# Patient Record
Sex: MALE | NOT HISPANIC OR LATINO | Employment: FULL TIME | URBAN - METROPOLITAN AREA
[De-identification: names, ages, dates, MRNs, and addresses within clinical notes are randomized per-mention and may not be internally consistent; named-entity substitution may affect disease eponyms.]

---

## 2023-03-03 LAB
ALANINE AMINOTRANSFERASE (SGPT) (U/L) IN SER/PLAS: 30 U/L (ref 10–52)
ALBUMIN (G/DL) IN SER/PLAS: 4.5 G/DL (ref 3.4–5)
ALKALINE PHOSPHATASE (U/L) IN SER/PLAS: 38 U/L (ref 33–120)
ANION GAP IN SER/PLAS: 14 MMOL/L (ref 10–20)
APPEARANCE, URINE: CLEAR
ASPARTATE AMINOTRANSFERASE (SGOT) (U/L) IN SER/PLAS: 25 U/L (ref 9–39)
BASOPHILS (10*3/UL) IN BLOOD BY AUTOMATED COUNT: 0.05 X10E9/L (ref 0–0.1)
BASOPHILS/100 LEUKOCYTES IN BLOOD BY AUTOMATED COUNT: 0.9 % (ref 0–2)
BILIRUBIN TOTAL (MG/DL) IN SER/PLAS: 1 MG/DL (ref 0–1.2)
BILIRUBIN, URINE: NEGATIVE
BLOOD, URINE: NEGATIVE
CALCIUM (MG/DL) IN SER/PLAS: 9.4 MG/DL (ref 8.6–10.3)
CARBON DIOXIDE, TOTAL (MMOL/L) IN SER/PLAS: 26 MMOL/L (ref 21–32)
CHLORIDE (MMOL/L) IN SER/PLAS: 106 MMOL/L (ref 98–107)
CHOLESTEROL (MG/DL) IN SER/PLAS: 159 MG/DL (ref 0–199)
CHOLESTEROL IN HDL (MG/DL) IN SER/PLAS: 66.2 MG/DL
CHOLESTEROL/HDL RATIO: 2.4
COLOR, URINE: YELLOW
CREATININE (MG/DL) IN SER/PLAS: 1.04 MG/DL (ref 0.5–1.3)
EOSINOPHILS (10*3/UL) IN BLOOD BY AUTOMATED COUNT: 0.03 X10E9/L (ref 0–0.7)
EOSINOPHILS/100 LEUKOCYTES IN BLOOD BY AUTOMATED COUNT: 0.5 % (ref 0–6)
ERYTHROCYTE DISTRIBUTION WIDTH (RATIO) BY AUTOMATED COUNT: 12.3 % (ref 11.5–14.5)
ERYTHROCYTE MEAN CORPUSCULAR HEMOGLOBIN CONCENTRATION (G/DL) BY AUTOMATED: 32.6 G/DL (ref 32–36)
ERYTHROCYTE MEAN CORPUSCULAR VOLUME (FL) BY AUTOMATED COUNT: 92 FL (ref 80–100)
ERYTHROCYTES (10*6/UL) IN BLOOD BY AUTOMATED COUNT: 4.82 X10E12/L (ref 4.5–5.9)
GFR MALE: >90 ML/MIN/1.73M2
GLUCOSE (MG/DL) IN SER/PLAS: 87 MG/DL (ref 74–99)
GLUCOSE, URINE: NEGATIVE MG/DL
HEMATOCRIT (%) IN BLOOD BY AUTOMATED COUNT: 44.2 % (ref 41–52)
HEMOGLOBIN (G/DL) IN BLOOD: 14.4 G/DL (ref 13.5–17.5)
IMMATURE GRANULOCYTES/100 LEUKOCYTES IN BLOOD BY AUTOMATED COUNT: 0.4 % (ref 0–0.9)
KETONES, URINE: NEGATIVE MG/DL
LDL: 79 MG/DL (ref 0–99)
LEUKOCYTE ESTERASE, URINE: NEGATIVE
LEUKOCYTES (10*3/UL) IN BLOOD BY AUTOMATED COUNT: 5.6 X10E9/L (ref 4.4–11.3)
LYMPHOCYTES (10*3/UL) IN BLOOD BY AUTOMATED COUNT: 1.76 X10E9/L (ref 1.2–4.8)
LYMPHOCYTES/100 LEUKOCYTES IN BLOOD BY AUTOMATED COUNT: 31.3 % (ref 13–44)
MONOCYTES (10*3/UL) IN BLOOD BY AUTOMATED COUNT: 0.53 X10E9/L (ref 0.1–1)
MONOCYTES/100 LEUKOCYTES IN BLOOD BY AUTOMATED COUNT: 9.4 % (ref 2–10)
NEUTROPHILS (10*3/UL) IN BLOOD BY AUTOMATED COUNT: 3.24 X10E9/L (ref 1.2–7.7)
NEUTROPHILS/100 LEUKOCYTES IN BLOOD BY AUTOMATED COUNT: 57.5 % (ref 40–80)
NITRITE, URINE: NEGATIVE
PH, URINE: 5 (ref 5–8)
PLATELETS (10*3/UL) IN BLOOD AUTOMATED COUNT: 248 X10E9/L (ref 150–450)
POTASSIUM (MMOL/L) IN SER/PLAS: 4.8 MMOL/L (ref 3.5–5.3)
PROSTATE SPECIFIC AG (NG/ML) IN SER/PLAS: 0.82 NG/ML (ref 0–4)
PROTEIN TOTAL: 7.4 G/DL (ref 6.4–8.2)
PROTEIN, URINE: NEGATIVE MG/DL
SODIUM (MMOL/L) IN SER/PLAS: 141 MMOL/L (ref 136–145)
SPECIFIC GRAVITY, URINE: 1.02 (ref 1–1.03)
THYROTROPIN (MIU/L) IN SER/PLAS BY DETECTION LIMIT <= 0.05 MIU/L: 0.76 MIU/L (ref 0.44–3.98)
TRIGLYCERIDE (MG/DL) IN SER/PLAS: 67 MG/DL (ref 0–149)
UREA NITROGEN (MG/DL) IN SER/PLAS: 12 MG/DL (ref 6–23)
UROBILINOGEN, URINE: <2 MG/DL (ref 0–1.9)
VLDL: 13 MG/DL (ref 0–40)

## 2023-03-04 LAB
ESTIMATED AVERAGE GLUCOSE FOR HBA1C: 105 MG/DL
HEMOGLOBIN A1C/HEMOGLOBIN TOTAL IN BLOOD: 5.3 %

## 2023-03-14 LAB
TESTOSTERONE FREE (CHAN): 79.4 PG/ML (ref 35–155)
TESTOSTERONE,TOTAL,LC-MS/MS: 356 NG/DL (ref 250–1100)

## 2024-02-20 ENCOUNTER — OFFICE VISIT (OUTPATIENT)
Dept: PRIMARY CARE | Facility: CLINIC | Age: 39
End: 2024-02-20
Payer: COMMERCIAL

## 2024-02-20 VITALS
HEIGHT: 71 IN | BODY MASS INDEX: 29.96 KG/M2 | WEIGHT: 214 LBS | SYSTOLIC BLOOD PRESSURE: 112 MMHG | HEART RATE: 76 BPM | DIASTOLIC BLOOD PRESSURE: 70 MMHG | TEMPERATURE: 97.9 F

## 2024-02-20 DIAGNOSIS — Z00.00 WELLNESS EXAMINATION: ICD-10-CM

## 2024-02-20 DIAGNOSIS — R23.9 SKIN CHANGE: ICD-10-CM

## 2024-02-20 DIAGNOSIS — Z12.5 PROSTATE CANCER SCREENING: ICD-10-CM

## 2024-02-20 DIAGNOSIS — Z00.00 ENCOUNTER FOR ROUTINE HISTORY AND PHYSICAL EXAMINATION OF ADULT: Primary | ICD-10-CM

## 2024-02-20 DIAGNOSIS — Z13.9 SCREENING DUE: ICD-10-CM

## 2024-02-20 DIAGNOSIS — B07.9 WARTS OF FOOT: ICD-10-CM

## 2024-02-20 DIAGNOSIS — G47.00 INSOMNIA, UNSPECIFIED TYPE: ICD-10-CM

## 2024-02-20 PROCEDURE — 99213 OFFICE O/P EST LOW 20 MIN: CPT | Performed by: INTERNAL MEDICINE

## 2024-02-20 PROCEDURE — 1036F TOBACCO NON-USER: CPT | Performed by: INTERNAL MEDICINE

## 2024-02-20 PROCEDURE — 99395 PREV VISIT EST AGE 18-39: CPT | Performed by: INTERNAL MEDICINE

## 2024-02-20 RX ORDER — OMEGA-3-ACID ETHYL ESTERS 1 G/1
1 CAPSULE, LIQUID FILLED ORAL 2 TIMES DAILY
COMMUNITY

## 2024-02-20 RX ORDER — MULTIVITAMIN
1 TABLET ORAL DAILY
COMMUNITY

## 2024-02-20 ASSESSMENT — ENCOUNTER SYMPTOMS
COUGH: 0
CHEST TIGHTNESS: 0
WHEEZING: 0
ALLERGIC/IMMUNOLOGIC NEGATIVE: 1
ENDOCRINE NEGATIVE: 1
HEMATOLOGIC/LYMPHATIC NEGATIVE: 1
SHORTNESS OF BREATH: 0
PSYCHIATRIC NEGATIVE: 1
NUMBNESS: 0
DIAPHORESIS: 0
EYE PAIN: 0
EYE ITCHING: 0
LIGHT-HEADEDNESS: 0
EYE DISCHARGE: 0
UNEXPECTED WEIGHT CHANGE: 0
ARTHRALGIAS: 0
FATIGUE: 0
FACIAL ASYMMETRY: 0
PALPITATIONS: 0
STRIDOR: 0
CHOKING: 0
PHOTOPHOBIA: 0
GASTROINTESTINAL NEGATIVE: 1
EYE REDNESS: 0
APNEA: 0
CHILLS: 0
DIZZINESS: 0
HEADACHES: 0
APPETITE CHANGE: 0
ACTIVITY CHANGE: 0
FEVER: 0

## 2024-02-20 NOTE — PROGRESS NOTES
"Subjective   Patient ID: Royce Dempsey is a 38 y.o. male who presents for Annual Exam.    HPI FOOT WART CAME BACK   FEELS WELL  DOING CARDIO   KNEES ARE OK   DIET IS BETTER   SLEEP IS AN ISSUE  STRESS LEVEL IS GOOD       SOCHX     NON SMOKER   SOME ETOH   2 KIDS        FMHX   \M A H    D A PROSTATE CANCER   Review of Systems   Constitutional:  Negative for activity change, appetite change, chills, diaphoresis, fatigue, fever and unexpected weight change.   HENT: Negative.     Eyes:  Negative for photophobia, pain, discharge, redness, itching and visual disturbance.   Respiratory:  Negative for apnea, cough, choking, chest tightness, shortness of breath, wheezing and stridor.    Cardiovascular:  Negative for chest pain, palpitations and leg swelling.   Gastrointestinal: Negative.    Endocrine: Negative.    Genitourinary: Negative.    Musculoskeletal:  Negative for arthralgias.   Skin: Negative.    Allergic/Immunologic: Negative.    Neurological:  Negative for dizziness, facial asymmetry, light-headedness, numbness and headaches.   Hematological: Negative.    Psychiatric/Behavioral: Negative.         Objective   /70   Pulse 76   Temp 36.6 °C (97.9 °F)   Ht 1.803 m (5' 11\")   Wt 97.1 kg (214 lb)   BMI 29.85 kg/m²     Physical Exam  Constitutional:       Appearance: Normal appearance.   HENT:      Head: Normocephalic and atraumatic.      Right Ear: Tympanic membrane normal.      Left Ear: Tympanic membrane normal.      Nose: Nose normal.   Eyes:      Extraocular Movements: Extraocular movements intact.      Conjunctiva/sclera: Conjunctivae normal.      Pupils: Pupils are equal, round, and reactive to light.   Cardiovascular:      Rate and Rhythm: Normal rate and regular rhythm.      Pulses: Normal pulses.      Heart sounds: Normal heart sounds.   Pulmonary:      Effort: Pulmonary effort is normal.      Breath sounds: Normal breath sounds.   Abdominal:      General: Abdomen is flat. Bowel sounds are " normal.      Palpations: Abdomen is soft.   Genitourinary:     Penis: Normal.       Testes: Normal.      Prostate: Normal.   Musculoskeletal:         General: Normal range of motion.      Cervical back: Normal range of motion and neck supple.   Skin:     General: Skin is warm and dry.   Neurological:      General: No focal deficit present.      Mental Status: Mental status is at baseline.   Psychiatric:         Mood and Affect: Mood normal.         Behavior: Behavior normal.         Thought Content: Thought content normal.         Judgment: Judgment normal.     FOOT WART   SKIN CHANGES     Assessment/Plan   Diagnoses and all orders for this visit:  Encounter for routine history and physical examination of adult  -     Referral to Podiatry; Future  -     Vitamin B12; Future  -     Urinalysis with Reflex Culture and Microscopic  -     TSH with reflex to Free T4 if abnormal; Future  Warts of foot  Comments:  BACK TO [facility]  Orders:  -     Referral to Podiatry; Future  -     Vitamin B12; Future  -     Urinalysis with Reflex Culture and Microscopic  -     TSH with reflex to Free T4 if abnormal; Future  Insomnia, unspecified type  Comments:  FAIR  Orders:  -     Referral to Podiatry; Future  -     Vitamin B12; Future  -     Urinalysis with Reflex Culture and Microscopic  -     TSH with reflex to Free T4 if abnormal; Future  Wellness examination  -     Referral to Podiatry; Future  -     CBC; Future  -     Lipid Panel; Future  -     Comprehensive Metabolic Panel; Future  -     Vitamin B12; Future  -     Urinalysis with Reflex Culture and Microscopic  -     TSH with reflex to Free T4 if abnormal; Future  Prostate cancer screening  -     Referral to Podiatry; Future  -     Prostate Specific Antigen, Screen; Future  -     Vitamin B12; Future  -     Urinalysis with Reflex Culture and Microscopic  -     TSH with reflex to Free T4 if abnormal; Future  Prediabetes  -     Referral to Podiatry; Future  -     Vitamin B12;  Future  -     Urinalysis with Reflex Culture and Microscopic  -     TSH with reflex to Free T4 if abnormal; Future  -     Hemoglobin A1C; Future

## 2024-03-05 ENCOUNTER — OFFICE VISIT (OUTPATIENT)
Dept: PODIATRY | Facility: CLINIC | Age: 39
End: 2024-03-05
Payer: COMMERCIAL

## 2024-03-05 VITALS — HEIGHT: 71 IN | BODY MASS INDEX: 29.96 KG/M2 | WEIGHT: 214 LBS

## 2024-03-05 DIAGNOSIS — M79.672 LEFT FOOT PAIN: ICD-10-CM

## 2024-03-05 DIAGNOSIS — B07.0 PLANTAR WART OF LEFT FOOT: Primary | ICD-10-CM

## 2024-03-05 PROCEDURE — 99213 OFFICE O/P EST LOW 20 MIN: CPT | Performed by: PODIATRIST

## 2024-03-05 PROCEDURE — 1036F TOBACCO NON-USER: CPT | Performed by: PODIATRIST

## 2024-03-05 NOTE — PROGRESS NOTES
"CC: left foot lesion    HPI:  Patient seen for plantar wart recurrence left foot, had co2 laser laser summer, no new medical issues.  PCP: Dr. Goode  Last visit: 2-20-24     PMH  History reviewed. No pertinent past medical history.  MEDS    Current Outpatient Medications:     multivitamin tablet, Take 1 tablet by mouth once daily., Disp: , Rfl:     omega-3 acid ethyl esters (Lovaza) 1 gram capsule, Take 1 capsule (1 g) by mouth 2 times a day., Disp: , Rfl:   Allergies  No Known Allergies  Social History     Socioeconomic History    Marital status:      Spouse name: None    Number of children: None    Years of education: None    Highest education level: None   Occupational History    None   Tobacco Use    Smoking status: Never     Passive exposure: Never    Smokeless tobacco: Never   Substance and Sexual Activity    Alcohol use: None    Drug use: None    Sexual activity: None   Other Topics Concern    None   Social History Narrative    None     Social Determinants of Health     Financial Resource Strain: Not on file   Food Insecurity: Not on file   Transportation Needs: Not on file   Physical Activity: Not on file   Stress: Not on file   Social Connections: Not on file   Intimate Partner Violence: Not on file   Housing Stability: Not on file     No family history on file.  History reviewed. No pertinent surgical history.    REVIEW OF SYSTEMS    + as noted above in HPI.       Physical examination:   On General Observation: Patient is a pleasant, cooperative, well developed 38 y.o.  adult male. The patient is alert and oriented to time, place and person. Patient has normal affect and mood.  Ht 1.803 m (5' 11\")   Wt 97.1 kg (214 lb)   BMI 29.85 kg/m²     Vascular:  DP and PT pulses are 2/4 b/l.  no edema noted. no varicosities b/l.  CFT  5 seconds to all digits bilateral.  Skin temperature is warm to warm from proximal to distal bilateral.      Muscular: Strength is 5/5 b/l.  Motor strength is normal and " symmetric proximally, as well as distally, in all four extremities. Good mobility of all extremities.  Tenderness to left heel.     Neuro:  Proprioception present.   Sensation to vibration is  intact. Protective sensation present  at all pedal sites via [place] Josef 5.07 monofilament bilateral.  Light touch present bilateral.     Derm:  lesion present plantar left heel 0.3cm by 0.3cm, capilalry budding present, loss of rstl.        ASSESSMENT:    Plantar wart left foot  Pain left foot     PLAN:   Exam  Reviewed with pt  Recurrence of the wart is present, reviewed options office vs surgical, reviewed recurrence rate   -reviewed surgical options pt wants to proceed with surgical excision of the lesion, reviewed risks, benefits, complications and nwb for 3 weeks, he understands and wishes to proceed, will obtain medical clearance prior and schedule for May/June.      Jeff Yarbrough DPM

## 2024-03-15 ENCOUNTER — LAB (OUTPATIENT)
Dept: LAB | Facility: LAB | Age: 39
End: 2024-03-15
Payer: COMMERCIAL

## 2024-03-15 DIAGNOSIS — G47.00 INSOMNIA, UNSPECIFIED TYPE: ICD-10-CM

## 2024-03-15 DIAGNOSIS — Z00.00 ENCOUNTER FOR ROUTINE HISTORY AND PHYSICAL EXAMINATION OF ADULT: ICD-10-CM

## 2024-03-15 DIAGNOSIS — B07.9 WARTS OF FOOT: ICD-10-CM

## 2024-03-15 DIAGNOSIS — Z13.9 SCREENING DUE: ICD-10-CM

## 2024-03-15 DIAGNOSIS — Z12.5 PROSTATE CANCER SCREENING: ICD-10-CM

## 2024-03-15 DIAGNOSIS — Z00.00 WELLNESS EXAMINATION: ICD-10-CM

## 2024-03-15 LAB
ALBUMIN SERPL BCP-MCNC: 4.6 G/DL (ref 3.4–5)
ALP SERPL-CCNC: 32 U/L (ref 33–120)
ALT SERPL W P-5'-P-CCNC: 34 U/L (ref 10–52)
ANION GAP SERPL CALC-SCNC: 13 MMOL/L (ref 10–20)
APPEARANCE UR: CLEAR
AST SERPL W P-5'-P-CCNC: 31 U/L (ref 9–39)
BILIRUB SERPL-MCNC: 1.1 MG/DL (ref 0–1.2)
BILIRUB UR STRIP.AUTO-MCNC: NEGATIVE MG/DL
BUN SERPL-MCNC: 11 MG/DL (ref 6–23)
CALCIUM SERPL-MCNC: 9 MG/DL (ref 8.6–10.3)
CHLORIDE SERPL-SCNC: 106 MMOL/L (ref 98–107)
CHOLEST SERPL-MCNC: 150 MG/DL (ref 0–199)
CHOLESTEROL/HDL RATIO: 2.2
CO2 SERPL-SCNC: 24 MMOL/L (ref 21–32)
COLOR UR: NORMAL
CREAT SERPL-MCNC: 1.19 MG/DL (ref 0.5–1.3)
EGFRCR SERPLBLD CKD-EPI 2021: 80 ML/MIN/1.73M*2
ERYTHROCYTE [DISTWIDTH] IN BLOOD BY AUTOMATED COUNT: 12.6 % (ref 11.5–14.5)
GLUCOSE SERPL-MCNC: 85 MG/DL (ref 74–99)
GLUCOSE UR STRIP.AUTO-MCNC: NORMAL MG/DL
HCT VFR BLD AUTO: 44.6 % (ref 41–52)
HDLC SERPL-MCNC: 67.6 MG/DL
HGB BLD-MCNC: 14.8 G/DL (ref 13.5–17.5)
HOLD SPECIMEN: NORMAL
KETONES UR STRIP.AUTO-MCNC: NEGATIVE MG/DL
LDLC SERPL CALC-MCNC: 67 MG/DL
LEUKOCYTE ESTERASE UR QL STRIP.AUTO: NEGATIVE
MCH RBC QN AUTO: 30.3 PG (ref 26–34)
MCHC RBC AUTO-ENTMCNC: 33.2 G/DL (ref 32–36)
MCV RBC AUTO: 91 FL (ref 80–100)
NITRITE UR QL STRIP.AUTO: NEGATIVE
NON HDL CHOLESTEROL: 82 MG/DL (ref 0–149)
NRBC BLD-RTO: 0 /100 WBCS (ref 0–0)
PH UR STRIP.AUTO: 7.5 [PH]
PLATELET # BLD AUTO: 247 X10*3/UL (ref 150–450)
POTASSIUM SERPL-SCNC: 5 MMOL/L (ref 3.5–5.3)
PROT SERPL-MCNC: 7.4 G/DL (ref 6.4–8.2)
PROT UR STRIP.AUTO-MCNC: NEGATIVE MG/DL
PSA SERPL-MCNC: 0.89 NG/ML
RBC # BLD AUTO: 4.88 X10*6/UL (ref 4.5–5.9)
RBC # UR STRIP.AUTO: NEGATIVE /UL
SODIUM SERPL-SCNC: 138 MMOL/L (ref 136–145)
SP GR UR STRIP.AUTO: 1.02
TRIGL SERPL-MCNC: 75 MG/DL (ref 0–149)
TSH SERPL-ACNC: 0.75 MIU/L (ref 0.44–3.98)
UROBILINOGEN UR STRIP.AUTO-MCNC: NORMAL MG/DL
VIT B12 SERPL-MCNC: 343 PG/ML (ref 211–911)
VLDL: 15 MG/DL (ref 0–40)
WBC # BLD AUTO: 5.6 X10*3/UL (ref 4.4–11.3)

## 2024-03-15 PROCEDURE — 80061 LIPID PANEL: CPT

## 2024-03-15 PROCEDURE — 82607 VITAMIN B-12: CPT

## 2024-03-15 PROCEDURE — 80053 COMPREHEN METABOLIC PANEL: CPT

## 2024-03-15 PROCEDURE — 84443 ASSAY THYROID STIM HORMONE: CPT

## 2024-03-15 PROCEDURE — 84153 ASSAY OF PSA TOTAL: CPT

## 2024-03-15 PROCEDURE — 83036 HEMOGLOBIN GLYCOSYLATED A1C: CPT

## 2024-03-15 PROCEDURE — 85027 COMPLETE CBC AUTOMATED: CPT

## 2024-03-15 PROCEDURE — 81003 URINALYSIS AUTO W/O SCOPE: CPT

## 2024-03-15 PROCEDURE — 36415 COLL VENOUS BLD VENIPUNCTURE: CPT

## 2024-03-16 LAB
EST. AVERAGE GLUCOSE BLD GHB EST-MCNC: 120 MG/DL
HBA1C MFR BLD: 5.8 %

## 2024-05-02 ENCOUNTER — TELEPHONE (OUTPATIENT)
Dept: PRIMARY CARE | Facility: CLINIC | Age: 39
End: 2024-05-02
Payer: COMMERCIAL

## 2024-05-06 ENCOUNTER — OFFICE VISIT (OUTPATIENT)
Dept: DERMATOLOGY | Facility: CLINIC | Age: 39
End: 2024-05-06
Payer: COMMERCIAL

## 2024-05-06 DIAGNOSIS — D22.9 ATYPICAL NEVUS: ICD-10-CM

## 2024-05-06 DIAGNOSIS — D22.9 MULTIPLE BENIGN NEVI: Primary | ICD-10-CM

## 2024-05-06 DIAGNOSIS — L85.3 XEROSIS CUTIS: ICD-10-CM

## 2024-05-06 PROCEDURE — 11301 SHAVE SKIN LESION 0.6-1.0 CM: CPT

## 2024-05-06 PROCEDURE — 88305 TISSUE EXAM BY PATHOLOGIST: CPT | Performed by: DERMATOLOGY

## 2024-05-06 PROCEDURE — 99204 OFFICE O/P NEW MOD 45 MIN: CPT

## 2024-05-06 NOTE — PROGRESS NOTES
Subjective   HPI: Royce Dempsey is a 38 y.o. male new patient who presents in office for a full body skin examination. Wants to establish.  No family history of skin cancer.    ROS: No other skin or systemic complaints other than what is documented elsewhere in the note.    ALLERGIES: Patient has no known allergies.    SOCIAL:  reports that he has never smoked. He has never been exposed to tobacco smoke. He has never used smokeless tobacco. No history on file for alcohol use and drug use.      Objective   A chaperone was present during the entirety of the examination.    Well appearing patient in no apparent distress; mood and affect are within normal limits.    A full examination was performed including scalp, head, eyes, ears, nose, lips, mouth, tongue, neck, chest, axillae, abdomen, back, buttocks, bilateral upper extremities, bilateral lower extremities, hands, feet, fingers, toes, fingernails, and toenails. Cervical, supraclavicular, axillary and inguinal lymph nodes were palpated. All findings within normal limits unless otherwise noted below.    Numerous benign appearing symmetrical, regular boarders, evenly pigmented, nevi    Right Abdomen (side) - Upper  This nevus is not well-demarcated and has dark brown pigmentation in the center light brown              Assessment/Plan   1. Multiple benign nevi    Discussed the need for annual body examination. The patient was advised to practice sun protection and sun avoidance, use daily sunscreen, and perform regular self skin exams.  Sun protection was discussed, including avoiding the mid-day sun, wearing a sunscreen with SPF at least 60, and stressing the need for reapplication of sunscreen and applying more than they think they need.     Discussed the ABCDEs of melanoma and recommended patient observe moles for any changes.  Patient verbalizes understanding.    2. Xerosis cutis    Dry skin is common, can be worsened by climate (dry climate makes worse), harsh  soaps, and lack of moisturizing. It may worsen as we age. I recommend a gentle skin care regimen including washing with a mild soap without fragrance such as dove for sensitive skin, cetaphil or cerave. Pat dry after washing and then apply a thick moisturizer such as Cerave cream.    3. Atypical nevus  Right Abdomen (side) - Upper    Discussed with patient that I recommend shave removal for biopsy.  Discussed wound care with patient including applying topical Vaseline once daily for 7 days.  I will MyChart message patient with biopsy results.    Shave removal - Right Abdomen (side) - Upper    Specimen 1 - Dermatopathology- DERM LAB  Differential Diagnosis: NAD  Check Margins Yes/No?:    Comments:    Dermpath Lab: Routine Histopathology (formalin-fixed tissue)         FOLLOW UP: 1 year fbse -I will MyChart message with biopsy results    The patient was encouraged to contact me with any further questions or concerns.  Carrie Leung PA-C  5/6/2024

## 2024-05-08 LAB
LABORATORY COMMENT REPORT: NORMAL
PATH REPORT.FINAL DX SPEC: NORMAL
PATH REPORT.GROSS SPEC: NORMAL
PATH REPORT.MICROSCOPIC SPEC OTHER STN: NORMAL
PATH REPORT.RELEVANT HX SPEC: NORMAL
PATH REPORT.TOTAL CANCER: NORMAL

## 2024-05-15 ENCOUNTER — TELEPHONE (OUTPATIENT)
Dept: PRIMARY CARE | Facility: CLINIC | Age: 39
End: 2024-05-15
Payer: COMMERCIAL

## 2024-05-15 NOTE — TELEPHONE ENCOUNTER
----- Message from Fide Guerra sent at 5/14/2024 11:40 AM EDT -----  Pt is calling to reschedule surgery. Please call him when you can. Thank you!

## 2024-05-15 NOTE — TELEPHONE ENCOUNTER
PATIENT IS SCHEDULED FOR SURGERY AT [facility] ON 9/23/24 @ 7:30 AM.    [employer] SUPERMED -PRIMARY  [facility] [place] SUPERMED-SECONDARY  [phone]  PER: NOEMI JACOBS IS IN NETWORK  [facility] IS IN NETWORK  CPT 56224  DX B07.0 AND M79.672 NO PRIOR AUTHORIZATION IS NEEDED  PLAN ALLOWS 1 IN 1 DAY TO BE CHARGED  REFERENCE #[id number]    FAXED TO SURGERY [facility]  FAXED TO FINANCIALS  PRE OP CLEARANCE SCHEDULED W DR ISAACS ON 9/3/24 @ 8:00 AM IN [facility]  CONFIRMED DATE AND TIME OF SURGERY W PATIENT  POST OP F/UP SCHEDULED ON 9/27/24 @ 10:00 AM IN [facility]APERWORK SENT

## 2024-05-15 NOTE — TELEPHONE ENCOUNTER
----- Message from Jeff Yarbrough DPM sent at 3/5/2024  1:39 PM EST -----  Regarding: surgery  Please schedule at [facility] for may/Elenita  Dx is plantar wart left foot  Procedure is excision plantar wart left foot  45 min, MAC sedation, med clearance with dr. Goode thanks

## 2024-05-15 NOTE — TELEPHONE ENCOUNTER
05/14/24  LMOM FOR PATIENT TO CALL ME TO RESCHEDULE SURGERY. THANK YOU!    5/15/24  LMOM FOR PATIENT TO CALL ME TO RESCHEDULE.    5/15/24  RETURNED CALL TO PATIENT. [facility] SURGERY RESCHEDULED TO 9/23/24 @ 7:30 AM. THANK YOU!

## 2024-06-28 ENCOUNTER — APPOINTMENT (OUTPATIENT)
Dept: PODIATRY | Facility: CLINIC | Age: 39
End: 2024-06-28
Payer: COMMERCIAL

## 2024-08-16 ENCOUNTER — APPOINTMENT (OUTPATIENT)
Dept: PODIATRY | Facility: CLINIC | Age: 39
End: 2024-08-16
Payer: COMMERCIAL

## 2024-09-03 ENCOUNTER — APPOINTMENT (OUTPATIENT)
Dept: PRIMARY CARE | Facility: CLINIC | Age: 39
End: 2024-09-03
Payer: COMMERCIAL

## 2024-09-03 VITALS
SYSTOLIC BLOOD PRESSURE: 120 MMHG | HEART RATE: 61 BPM | BODY MASS INDEX: 29.03 KG/M2 | OXYGEN SATURATION: 97 % | DIASTOLIC BLOOD PRESSURE: 80 MMHG | WEIGHT: 219 LBS | TEMPERATURE: 96.6 F | HEIGHT: 73 IN

## 2024-09-03 DIAGNOSIS — B07.9 WARTS OF FOOT: ICD-10-CM

## 2024-09-03 DIAGNOSIS — R73.02 IGT (IMPAIRED GLUCOSE TOLERANCE): Primary | ICD-10-CM

## 2024-09-03 LAB
POC FINGERSTICK BLOOD GLUCOSE: 106 MG/DL (ref 70–100)
POC HEMOGLOBIN A1C: 5 % (ref 4.2–6.5)

## 2024-09-03 PROCEDURE — 3008F BODY MASS INDEX DOCD: CPT | Performed by: INTERNAL MEDICINE

## 2024-09-03 PROCEDURE — 83036 HEMOGLOBIN GLYCOSYLATED A1C: CPT | Performed by: INTERNAL MEDICINE

## 2024-09-03 PROCEDURE — 82962 GLUCOSE BLOOD TEST: CPT | Performed by: INTERNAL MEDICINE

## 2024-09-03 PROCEDURE — 99213 OFFICE O/P EST LOW 20 MIN: CPT | Performed by: INTERNAL MEDICINE

## 2024-09-03 NOTE — PROGRESS NOTES
"Subjective   Patient ID: Royce Dempsey is a 38 y.o. male who presents for Pre-op Exam ( - foot surgery).    HPI pre op foot surgery   See notes   No medical issues   See IGT     PMHX, PSHX, ALL, SOCHX, PRE MED ALL REVIEWED     Review of Systems    Objective   /80 (BP Location: Left arm, Patient Position: Sitting, BP Cuff Size: Adult)   Pulse 61   Temp 35.9 °C (96.6 °F)   Ht 1.854 m (6' 1\")   Wt 99.3 kg (219 lb)   SpO2 97%   BMI 28.89 kg/m²     Physical Exam  NAD  CARD RRR  PULM CTA  ABD NEG   EXT  NL    Assessment/Plan   Diagnoses and all orders for this visit:  IGT (impaired glucose tolerance)  Comments:  sugar now we did discuss  Warts of foot  Comments:  OK FOR OR         "

## 2024-09-23 PROCEDURE — 88305 TISSUE EXAM BY PATHOLOGIST: CPT

## 2024-09-23 PROCEDURE — 0753T DGTZ GLS MCRSCP SLD LEVEL IV: CPT

## 2024-09-24 ENCOUNTER — TELEPHONE (OUTPATIENT)
Dept: PRIMARY CARE | Facility: CLINIC | Age: 39
End: 2024-09-24
Payer: COMMERCIAL

## 2024-09-24 ENCOUNTER — LAB REQUISITION (OUTPATIENT)
Dept: LAB | Facility: HOSPITAL | Age: 39
End: 2024-09-24
Payer: COMMERCIAL

## 2024-09-24 DIAGNOSIS — B07.0 PLANTAR WART: ICD-10-CM

## 2024-09-24 NOTE — TELEPHONE ENCOUNTER
MAURA NURSE CALLING FROM [employer]  CALLING AND WANTING INFORMATION   TRYING TO PROCESS SHORT TERM DISABILITY CLAIM  NEEDS DATE AND TYPE OF SURGERY  FIRST CONSULT DATE IN THE OFFICE  NEXT SCHEDULED APPT  [phone]

## 2024-09-25 NOTE — TELEPHONE ENCOUNTER
LEFT MESSAGE ON MACHINE FOR MAURA   PLANTAR WART SURGERY LEFT FOOT 9/23/24  INITIAL CONSULT: 3/23/23  NEXT SCHEDULED F/U: 9/27/24

## 2024-09-27 ENCOUNTER — APPOINTMENT (OUTPATIENT)
Dept: PODIATRY | Facility: CLINIC | Age: 39
End: 2024-09-27
Payer: COMMERCIAL

## 2024-09-27 DIAGNOSIS — B07.0 PLANTAR WART OF LEFT FOOT: Primary | ICD-10-CM

## 2024-09-27 LAB
LABORATORY COMMENT REPORT: NORMAL
PATH REPORT.FINAL DX SPEC: NORMAL
PATH REPORT.GROSS SPEC: NORMAL
PATH REPORT.TOTAL CANCER: NORMAL

## 2024-09-27 PROCEDURE — 1036F TOBACCO NON-USER: CPT | Performed by: PODIATRIST

## 2024-09-27 PROCEDURE — 99024 POSTOP FOLLOW-UP VISIT: CPT | Performed by: PODIATRIST

## 2024-09-27 NOTE — PROGRESS NOTES
"CC: left foot lesion     HPI:  Patient seen for plantar wart POX excision, doing the wound care and soaks, denies any n/v/f/c.  PCP: Dr. Goode  Last visit: 2-20-24      PMH  Medical History   History reviewed. No pertinent past medical history.     MEDS    Current Medications      Current Outpatient Medications:     multivitamin tablet, Take 1 tablet by mouth once daily., Disp: , Rfl:     omega-3 acid ethyl esters (Lovaza) 1 gram capsule, Take 1 capsule (1 g) by mouth 2 times a day., Disp: , Rfl:      Allergies  Allergies   No Known Allergies     Social History   Social History            Socioeconomic History    Marital status:        Spouse name: None    Number of children: None    Years of education: None    Highest education level: None   Occupational History    None   Tobacco Use    Smoking status: Never       Passive exposure: Never    Smokeless tobacco: Never   Substance and Sexual Activity    Alcohol use: None    Drug use: None    Sexual activity: None   Other Topics Concern    None   Social History Narrative    None      Social Determinants of Health      Financial Resource Strain: Not on file   Food Insecurity: Not on file   Transportation Needs: Not on file   Physical Activity: Not on file   Stress: Not on file   Social Connections: Not on file   Intimate Partner Violence: Not on file   Housing Stability: Not on file         Family History   No family history on file.     Surgical History   History reviewed. No pertinent surgical history.        REVIEW OF SYSTEMS     + as noted above in HPI.         Physical examination:   On General Observation: Patient is a pleasant, cooperative, well developed 38 y.o.  adult male. The patient is alert and oriented to time, place and person. Patient has normal affect and mood.  Ht 1.803 m (5' 11\")   Wt 97.1 kg (214 lb)   BMI 29.85 kg/m²      Vascular:  DP and PT pulses are 2/4 b/l.  no edema noted. no varicosities b/l.  CFT  5 seconds to all digits bilateral.  " Skin temperature is warm to warm from proximal to distal bilateral.       Muscular: Strength is 5/5 b/l.  Motor strength is normal and symmetric proximally, as well as distally, in all four extremities. Good mobility of all extremities.  Tenderness to left heel.      Neuro:  Proprioception present.   Sensation to vibration is  intact. Protective sensation present  at all pedal sites via [place] Josef 5.07 monofilament bilateral.  Light touch present bilateral.      Derm:  lesion present plantar left heel fibro granular base, no odor or drainage present.           ASSESSMENT:    Plantar wart left foot       PLAN:   POV  Doing well, will continue the wound care and soaks, can start to leave open to air in one week, then stop all wound care and soaks in 2 weeks, will still cover with dry dressing, weight bearing to tolerance, will follow up 2 weeks, will review path report once completed.        Jeff Yarbrough DPM

## 2024-10-11 ENCOUNTER — APPOINTMENT (OUTPATIENT)
Dept: PODIATRY | Facility: CLINIC | Age: 39
End: 2024-10-11
Payer: COMMERCIAL

## 2024-10-11 DIAGNOSIS — B07.0 PLANTAR WART OF LEFT FOOT: Primary | ICD-10-CM

## 2024-10-11 PROCEDURE — 1036F TOBACCO NON-USER: CPT | Performed by: PODIATRIST

## 2024-10-11 PROCEDURE — 99024 POSTOP FOLLOW-UP VISIT: CPT | Performed by: PODIATRIST

## 2024-10-11 NOTE — PROGRESS NOTES
"CC: left foot lesion     HPI:  Patient seen for plantar wart POX excision, doing the wound care and soaks, denies any n/v/f/c. The area is healing.  PCP: Dr. Goode  Last visit: 2-20-24      PMH  Medical History   History reviewed. No pertinent past medical history.      MEDS     Current Medications      Current Outpatient Medications:     multivitamin tablet, Take 1 tablet by mouth once daily., Disp: , Rfl:     omega-3 acid ethyl esters (Lovaza) 1 gram capsule, Take 1 capsule (1 g) by mouth 2 times a day., Disp: , Rfl:       Allergies  Allergies   No Known Allergies      Social History   Social History                Socioeconomic History    Marital status:        Spouse name: None    Number of children: None    Years of education: None    Highest education level: None   Occupational History    None   Tobacco Use    Smoking status: Never       Passive exposure: Never    Smokeless tobacco: Never   Substance and Sexual Activity    Alcohol use: None    Drug use: None    Sexual activity: None   Other Topics Concern    None   Social History Narrative    None      Social Determinants of Health      Financial Resource Strain: Not on file   Food Insecurity: Not on file   Transportation Needs: Not on file   Physical Activity: Not on file   Stress: Not on file   Social Connections: Not on file   Intimate Partner Violence: Not on file   Housing Stability: Not on file         Family History   No family history on file.      Surgical History   History reviewed. No pertinent surgical history.         REVIEW OF SYSTEMS     + as noted above in HPI.         Physical examination:   On General Observation: Patient is a pleasant, cooperative, well developed 38 y.o.  adult male. The patient is alert and oriented to time, place and person. Patient has normal affect and mood.  Ht 1.803 m (5' 11\")   Wt 97.1 kg (214 lb)   BMI 29.85 kg/m²      Vascular:  DP and PT pulses are 2/4 b/l.  no edema noted. no varicosities b/l.  CFT  5 " seconds to all digits bilateral.  Skin temperature is warm to warm from proximal to distal bilateral.       Muscular: Strength is 5/5 b/l.  Motor strength is normal and symmetric proximally, as well as distally, in all four extremities. Good mobility of all extremities.  Tenderness to left heel.      Neuro:  Proprioception present.   Sensation to vibration is  intact. Protective sensation present  at all pedal sites via [place] Josef 5.07 monofilament bilateral.  Light touch present bilateral.      Derm:  lesion present plantar left heel granular base, no odor or drainage present.           ASSESSMENT:    Plantar wart left foot        PLAN:   POV  Doing well, will stop the wound care, leave open to air at night, follow up as needed.        Jeff Yarbrough DPM

## 2025-02-25 ASSESSMENT — PROMIS GLOBAL HEALTH SCALE
RATE_QUALITY_OF_LIFE: VERY GOOD
CARRYOUT_SOCIAL_ACTIVITIES: VERY GOOD
RATE_MENTAL_HEALTH: GOOD
RATE_GENERAL_HEALTH: GOOD
RATE_AVERAGE_FATIGUE: MODERATE
RATE_SOCIAL_SATISFACTION: VERY GOOD
RATE_PHYSICAL_HEALTH: GOOD
CARRYOUT_PHYSICAL_ACTIVITIES: COMPLETELY
EMOTIONAL_PROBLEMS: SOMETIMES
RATE_AVERAGE_PAIN: 0

## 2025-02-27 ENCOUNTER — APPOINTMENT (OUTPATIENT)
Dept: PRIMARY CARE | Facility: CLINIC | Age: 40
End: 2025-02-27
Payer: COMMERCIAL

## 2025-02-27 VITALS
HEART RATE: 74 BPM | TEMPERATURE: 97.8 F | OXYGEN SATURATION: 99 % | BODY MASS INDEX: 29.18 KG/M2 | SYSTOLIC BLOOD PRESSURE: 130 MMHG | WEIGHT: 208.4 LBS | DIASTOLIC BLOOD PRESSURE: 80 MMHG | HEIGHT: 71 IN

## 2025-02-27 DIAGNOSIS — D22.9 ATYPICAL NEVI: ICD-10-CM

## 2025-02-27 DIAGNOSIS — Z12.5 PROSTATE CANCER SCREENING: ICD-10-CM

## 2025-02-27 DIAGNOSIS — R73.02 IGT (IMPAIRED GLUCOSE TOLERANCE): ICD-10-CM

## 2025-02-27 DIAGNOSIS — Z13.9 SCREENING DUE: ICD-10-CM

## 2025-02-27 DIAGNOSIS — F51.01 PRIMARY INSOMNIA: ICD-10-CM

## 2025-02-27 DIAGNOSIS — Z00.00 ENCOUNTER FOR ROUTINE HISTORY AND PHYSICAL EXAMINATION OF ADULT: ICD-10-CM

## 2025-02-27 DIAGNOSIS — Z00.00 WELLNESS EXAMINATION: Primary | ICD-10-CM

## 2025-02-27 PROCEDURE — 99395 PREV VISIT EST AGE 18-39: CPT | Performed by: INTERNAL MEDICINE

## 2025-02-27 PROCEDURE — 3008F BODY MASS INDEX DOCD: CPT | Performed by: INTERNAL MEDICINE

## 2025-02-27 ASSESSMENT — ENCOUNTER SYMPTOMS
UNEXPECTED WEIGHT CHANGE: 0
FATIGUE: 0
DIAPHORESIS: 0
ENDOCRINE NEGATIVE: 1
PALPITATIONS: 0
CHILLS: 0
APPETITE CHANGE: 0
EYE DISCHARGE: 0
ARTHRALGIAS: 0
FEVER: 0
COUGH: 0
FACIAL ASYMMETRY: 0
EYE ITCHING: 0
GASTROINTESTINAL NEGATIVE: 1
NUMBNESS: 0
CHOKING: 0
ALLERGIC/IMMUNOLOGIC NEGATIVE: 1
SHORTNESS OF BREATH: 0
PSYCHIATRIC NEGATIVE: 1
DIZZINESS: 0
EYE REDNESS: 0
EYE PAIN: 0
LIGHT-HEADEDNESS: 0
APNEA: 0
CHEST TIGHTNESS: 0
STRIDOR: 0
HEADACHES: 0
ACTIVITY CHANGE: 0
PHOTOPHOBIA: 0
WHEEZING: 0
HEMATOLOGIC/LYMPHATIC NEGATIVE: 1

## 2025-02-27 ASSESSMENT — PATIENT HEALTH QUESTIONNAIRE - PHQ9
2. FEELING DOWN, DEPRESSED OR HOPELESS: NOT AT ALL
SUM OF ALL RESPONSES TO PHQ9 QUESTIONS 1 AND 2: 0
1. LITTLE INTEREST OR PLEASURE IN DOING THINGS: NOT AT ALL

## 2025-02-27 NOTE — PROGRESS NOTES
"Subjective   Patient ID: Royce Dempsey is a 39 y.o. male who presents for Annual Exam.    HPI   Diet   Weight loss  Better sleep   Melatonin       No Known Allergies  Current Outpatient Medications on File Prior to Visit   Medication Sig Dispense Refill   Multiple Vitamin (Multi Vitamin) tablet Take 1 tablet by mouth daily.     No current facility-administered medications on file prior to visit.     Patient Active Problem List   Diagnosis   Encounter for vasectomy     Social History    , 2 kids , work stress , marriage is good, kids ok   Tobacco Use   Smoking status: Never   Smokeless tobacco: Never   Substance Use Topics   Alcohol use: Yes , social   Coffee a lot in am   Diet is clean     Review of Systems   Constitutional:  Negative for activity change, appetite change, chills, diaphoresis, fatigue, fever and unexpected weight change.   HENT: Negative.     Eyes:  Negative for photophobia, pain, discharge, redness, itching and visual disturbance.   Respiratory:  Negative for apnea, cough, choking, chest tightness, shortness of breath, wheezing and stridor.    Cardiovascular:  Negative for chest pain, palpitations and leg swelling.   Gastrointestinal: Negative.    Endocrine: Negative.    Genitourinary: Negative.    Musculoskeletal:  Negative for arthralgias.   Skin: Negative.    Allergic/Immunologic: Negative.    Neurological:  Negative for dizziness, facial asymmetry, light-headedness, numbness and headaches.   Hematological: Negative.    Psychiatric/Behavioral: Negative.         Objective   /80 (BP Location: Left arm, Patient Position: Sitting, BP Cuff Size: Large adult)   Pulse 74   Temp 36.6 °C (97.8 °F) (Temporal)   Ht 1.803 m (5' 11\")   Wt 94.5 kg (208 lb 6.4 oz)   SpO2 99%   BMI 29.07 kg/m²     Physical Exam  Constitutional:       Appearance: Normal appearance.   HENT:      Head: Normocephalic and atraumatic.      Right Ear: Tympanic membrane normal.      Left Ear: Tympanic membrane " normal.      Nose: Nose normal.   Eyes:      Extraocular Movements: Extraocular movements intact.      Conjunctiva/sclera: Conjunctivae normal.      Pupils: Pupils are equal, round, and reactive to light.   Cardiovascular:      Rate and Rhythm: Normal rate and regular rhythm.      Pulses: Normal pulses.      Heart sounds: Normal heart sounds.   Pulmonary:      Effort: Pulmonary effort is normal.      Breath sounds: Normal breath sounds.   Abdominal:      General: Abdomen is flat. Bowel sounds are normal.      Palpations: Abdomen is soft.   Musculoskeletal:         General: Normal range of motion.      Cervical back: Normal range of motion and neck supple.   Skin:     General: Skin is warm and dry.   Neurological:      General: No focal deficit present.      Mental Status: He is oriented to person, place, and time. Mental status is at baseline.   Psychiatric:         Mood and Affect: Mood normal.         Behavior: Behavior normal.         Thought Content: Thought content normal.         Judgment: Judgment normal.       Assessment/Plan   Diagnoses and all orders for this visit:  Wellness examination  -     CBC; Future  -     Lipid Panel; Future  -     Comprehensive Metabolic Panel; Future  -     TSH with reflex to Free T4 if abnormal; Future  -     Urinalysis with Reflex Culture and Microscopic; Future  -     Hemoglobin A1C; Future  -     Referral to Orthopaedic Surgery; Future  Primary insomnia  Comments:  failed meds  Orders:  -     TSH with reflex to Free T4 if abnormal; Future  -     Urinalysis with Reflex Culture and Microscopic; Future  -     Hemoglobin A1C; Future  -     Referral to Orthopaedic Surgery; Future  IGT (impaired glucose tolerance)  Comments:  good  Orders:  -     TSH with reflex to Free T4 if abnormal; Future  -     Urinalysis with Reflex Culture and Microscopic; Future  -     Hemoglobin A1C; Future  -     Referral to Orthopaedic Surgery; Future  Encounter for routine history and physical  examination of adult  -     TSH with reflex to Free T4 if abnormal; Future  -     Urinalysis with Reflex Culture and Microscopic; Future  -     Hemoglobin A1C; Future  -     Referral to Orthopaedic Surgery; Future  Screening due  -     TSH with reflex to Free T4 if abnormal; Future  -     Urinalysis with Reflex Culture and Microscopic; Future  -     Hemoglobin A1C; Future  -     Referral to Orthopaedic Surgery; Future  Prostate cancer screening  -     Prostate Specific Antigen, Screen; Future  -     TSH with reflex to Free T4 if abnormal; Future  -     Urinalysis with Reflex Culture and Microscopic; Future  -     Hemoglobin A1C; Future  -     Referral to Orthopaedic Surgery; Future  Atypical nevi  -     Referral to Dermatology  -     Referral to Orthopaedic Surgery; Future  Other orders  -     Follow Up In Primary Care - Established

## 2025-02-28 LAB
ALBUMIN SERPL-MCNC: 4.7 G/DL (ref 3.6–5.1)
ALP SERPL-CCNC: 32 U/L (ref 36–130)
ALT SERPL-CCNC: 20 U/L (ref 9–46)
ANION GAP SERPL CALCULATED.4IONS-SCNC: 8 MMOL/L (CALC) (ref 7–17)
APPEARANCE UR: CLEAR
AST SERPL-CCNC: 20 U/L (ref 10–40)
BACTERIA #/AREA URNS HPF: ABNORMAL /HPF
BACTERIA UR CULT: ABNORMAL
BILIRUB SERPL-MCNC: 1 MG/DL (ref 0.2–1.2)
BILIRUB UR QL STRIP: NEGATIVE
BUN SERPL-MCNC: 13 MG/DL (ref 7–25)
CALCIUM SERPL-MCNC: 9.5 MG/DL (ref 8.6–10.3)
CHLORIDE SERPL-SCNC: 105 MMOL/L (ref 98–110)
CHOLEST SERPL-MCNC: 148 MG/DL
CHOLEST/HDLC SERPL: 2.3 (CALC)
CO2 SERPL-SCNC: 25 MMOL/L (ref 20–32)
COLOR UR: YELLOW
CREAT SERPL-MCNC: 1.17 MG/DL (ref 0.6–1.26)
EGFRCR SERPLBLD CKD-EPI 2021: 81 ML/MIN/1.73M2
ERYTHROCYTE [DISTWIDTH] IN BLOOD BY AUTOMATED COUNT: 12.2 % (ref 11–15)
EST. AVERAGE GLUCOSE BLD GHB EST-MCNC: 105 MG/DL
EST. AVERAGE GLUCOSE BLD GHB EST-SCNC: 5.8 MMOL/L
GLUCOSE SERPL-MCNC: 92 MG/DL (ref 65–99)
GLUCOSE UR QL STRIP: NEGATIVE
HBA1C MFR BLD: 5.3 % OF TOTAL HGB
HCT VFR BLD AUTO: 45 % (ref 38.5–50)
HDLC SERPL-MCNC: 63 MG/DL
HGB BLD-MCNC: 15.1 G/DL (ref 13.2–17.1)
HGB UR QL STRIP: NEGATIVE
HYALINE CASTS #/AREA URNS LPF: ABNORMAL /LPF
KETONES UR QL STRIP: ABNORMAL
LDLC SERPL CALC-MCNC: 69 MG/DL (CALC)
LEUKOCYTE ESTERASE UR QL STRIP: NEGATIVE
MCH RBC QN AUTO: 30.5 PG (ref 27–33)
MCHC RBC AUTO-ENTMCNC: 33.6 G/DL (ref 32–36)
MCV RBC AUTO: 90.9 FL (ref 80–100)
NITRITE UR QL STRIP: NEGATIVE
NONHDLC SERPL-MCNC: 85 MG/DL (CALC)
PH UR STRIP: 6 [PH] (ref 5–8)
PLATELET # BLD AUTO: 251 THOUSAND/UL (ref 140–400)
PMV BLD REES-ECKER: 12.6 FL (ref 7.5–12.5)
POTASSIUM SERPL-SCNC: 4.4 MMOL/L (ref 3.5–5.3)
PROT SERPL-MCNC: 7.5 G/DL (ref 6.1–8.1)
PROT UR QL STRIP: NEGATIVE
PSA SERPL-MCNC: 0.91 NG/ML
RBC # BLD AUTO: 4.95 MILLION/UL (ref 4.2–5.8)
RBC #/AREA URNS HPF: ABNORMAL /HPF
SERVICE CMNT-IMP: ABNORMAL
SODIUM SERPL-SCNC: 138 MMOL/L (ref 135–146)
SP GR UR STRIP: 1.01 (ref 1–1.03)
SQUAMOUS #/AREA URNS HPF: ABNORMAL /HPF
TRIGL SERPL-MCNC: 78 MG/DL
TSH SERPL-ACNC: 0.7 MIU/L (ref 0.4–4.5)
WBC # BLD AUTO: 7 THOUSAND/UL (ref 3.8–10.8)
WBC #/AREA URNS HPF: ABNORMAL /HPF

## 2025-03-24 ENCOUNTER — APPOINTMENT (OUTPATIENT)
Dept: PRIMARY CARE | Facility: CLINIC | Age: 40
End: 2025-03-24
Payer: COMMERCIAL

## 2025-03-24 VITALS
TEMPERATURE: 97.5 F | HEART RATE: 77 BPM | SYSTOLIC BLOOD PRESSURE: 132 MMHG | WEIGHT: 212.4 LBS | DIASTOLIC BLOOD PRESSURE: 84 MMHG | BODY MASS INDEX: 29.62 KG/M2 | OXYGEN SATURATION: 96 %

## 2025-03-24 DIAGNOSIS — F43.9 STRESS: ICD-10-CM

## 2025-03-24 DIAGNOSIS — R29.90 ALTERATION IN NEUROLOGICAL STATUS: Primary | ICD-10-CM

## 2025-03-24 DIAGNOSIS — Z82.3 FAMILY HISTORY OF STROKE: ICD-10-CM

## 2025-03-24 PROCEDURE — 99214 OFFICE O/P EST MOD 30 MIN: CPT | Performed by: INTERNAL MEDICINE

## 2025-03-24 RX ORDER — ALPRAZOLAM 0.5 MG/1
0.5 TABLET ORAL 3 TIMES DAILY PRN
Qty: 10 TABLET | Refills: 0 | Status: SHIPPED | OUTPATIENT
Start: 2025-03-24 | End: 2025-11-19

## 2025-03-24 ASSESSMENT — PATIENT HEALTH QUESTIONNAIRE - PHQ9: 1. LITTLE INTEREST OR PLEASURE IN DOING THINGS: NOT AT ALL

## 2025-03-24 NOTE — PROGRESS NOTES
Subjective   Patient ID: Royce Dempsey is a 39 y.o. male who presents for Medicare Annual Wellness Visit Subsequent (Follow stroke) and Follow-up (Follow up stroke symptoms).    HPI 2 EPISODES OF FLIGHT RELATED CHANGE OF NEURO STATUS   BOTH RESOLVED ON ITS OWN  HIS FATHER HAD CVA AT AGE 41    ER NOTES  Patient is a 39 year old male who presents today with mild headache and feeling disoriented.       CT BRAIN WO IVCON  Final Result  IMPRESSION:    No acute intracranial process.   Review of Systems    Objective   /84 (BP Location: Left arm, Patient Position: Sitting, BP Cuff Size: Adult)   Pulse 77   Temp 36.4 °C (97.5 °F) (Temporal)   Wt 96.3 kg (212 lb 6.4 oz)   SpO2 96%   BMI 29.62 kg/m²     Physical Exam  NAD  CARD RRR  PULM CTA  ABD NEG   EXT  NL    Assessment/Plan   Diagnoses and all orders for this visit:  Alteration in neurological status  Comments:  seed er and urgent care x 2  begin aspirin  Orders:  -     MR angio head w and wo IV contrast; Future  -     ALPRAZolam (Xanax) 0.5 mg tablet; Take 1 tablet (0.5 mg) by mouth 3 times a day as needed for anxiety.  Family history of stroke  Comments:  young father  Orders:  -     MR angio head w and wo IV contrast; Future  -     ALPRAZolam (Xanax) 0.5 mg tablet; Take 1 tablet (0.5 mg) by mouth 3 times a day as needed for anxiety.  Stress  -     MR angio head w and wo IV contrast; Future  -     ALPRAZolam (Xanax) 0.5 mg tablet; Take 1 tablet (0.5 mg) by mouth 3 times a day as needed for anxiety.

## 2025-04-07 ENCOUNTER — HOSPITAL ENCOUNTER (OUTPATIENT)
Dept: RADIOLOGY | Facility: CLINIC | Age: 40
Discharge: HOME | End: 2025-04-07
Payer: COMMERCIAL

## 2025-04-07 DIAGNOSIS — R29.90 ALTERATION IN NEUROLOGICAL STATUS: ICD-10-CM

## 2025-04-07 DIAGNOSIS — Z82.3 FAMILY HISTORY OF STROKE: ICD-10-CM

## 2025-04-07 DIAGNOSIS — F43.9 STRESS: ICD-10-CM

## 2025-04-07 PROCEDURE — 70544 MR ANGIOGRAPHY HEAD W/O DYE: CPT | Performed by: RADIOLOGY

## 2025-04-07 PROCEDURE — 70544 MR ANGIOGRAPHY HEAD W/O DYE: CPT

## 2025-06-04 ENCOUNTER — APPOINTMENT (OUTPATIENT)
Dept: PODIATRY | Facility: CLINIC | Age: 40
End: 2025-06-04
Payer: COMMERCIAL

## 2025-08-12 ENCOUNTER — APPOINTMENT (OUTPATIENT)
Dept: PRIMARY CARE | Facility: CLINIC | Age: 40
End: 2025-08-12
Payer: COMMERCIAL

## 2025-09-11 ENCOUNTER — APPOINTMENT (OUTPATIENT)
Dept: PRIMARY CARE | Facility: CLINIC | Age: 40
End: 2025-09-11
Payer: COMMERCIAL

## 2025-12-11 ENCOUNTER — APPOINTMENT (OUTPATIENT)
Dept: DERMATOLOGY | Facility: CLINIC | Age: 40
End: 2025-12-11
Payer: COMMERCIAL